# Patient Record
Sex: FEMALE | Race: WHITE | NOT HISPANIC OR LATINO | Employment: OTHER | URBAN - METROPOLITAN AREA
[De-identification: names, ages, dates, MRNs, and addresses within clinical notes are randomized per-mention and may not be internally consistent; named-entity substitution may affect disease eponyms.]

---

## 2021-03-06 ENCOUNTER — CLINICAL SUPPORT (OUTPATIENT)
Dept: URGENT CARE | Facility: CLINIC | Age: 66
End: 2021-03-06
Payer: COMMERCIAL

## 2021-03-06 VITALS — TEMPERATURE: 98 F

## 2021-03-06 DIAGNOSIS — Z11.52 ENCOUNTER FOR SCREENING FOR COVID-19: Primary | ICD-10-CM

## 2021-03-06 LAB
CTP QC/QA: YES
SARS-COV-2 RDRP RESP QL NAA+PROBE: NEGATIVE

## 2021-05-04 ENCOUNTER — PATIENT MESSAGE (OUTPATIENT)
Dept: RESEARCH | Facility: HOSPITAL | Age: 66
End: 2021-05-04

## 2021-05-10 ENCOUNTER — PATIENT MESSAGE (OUTPATIENT)
Dept: RESEARCH | Facility: HOSPITAL | Age: 66
End: 2021-05-10

## 2024-09-19 DIAGNOSIS — S42.292A OTHER CLOSED DISPLACED FRACTURE OF PROXIMAL END OF LEFT HUMERUS, INITIAL ENCOUNTER: ICD-10-CM

## 2024-09-19 DIAGNOSIS — S52.542A CLOSED SMITH'S FRACTURE OF LEFT RADIUS, INITIAL ENCOUNTER: Primary | ICD-10-CM

## 2024-09-19 DIAGNOSIS — S42.309A HUMERUS SHAFT FRACTURE: ICD-10-CM

## 2024-09-19 PROBLEM — S42.402A CLOSED FRACTURE OF DISTAL END OF LEFT HUMERUS: Status: RESOLVED | Noted: 2024-09-19 | Resolved: 2024-09-19

## 2024-09-19 PROBLEM — F32.A DEPRESSION: Status: ACTIVE | Noted: 2024-09-19

## 2024-09-19 PROBLEM — S42.402A CLOSED FRACTURE OF DISTAL END OF LEFT HUMERUS: Status: ACTIVE | Noted: 2024-09-19

## 2024-09-19 PROBLEM — Z71.89 ACP (ADVANCE CARE PLANNING): Status: ACTIVE | Noted: 2024-09-19

## 2024-09-19 PROBLEM — S32.592A CLOSED FRACTURE OF RAMUS OF LEFT PUBIS: Status: ACTIVE | Noted: 2024-09-19

## 2024-09-19 PROBLEM — S52.502A CLOSED FRACTURE OF DISTAL END OF LEFT RADIUS: Status: ACTIVE | Noted: 2024-09-19

## 2024-09-19 PROBLEM — S42.202A CLOSED FRACTURE OF PROXIMAL END OF LEFT HUMERUS: Status: ACTIVE | Noted: 2024-09-19

## 2024-09-19 PROBLEM — I10 PRIMARY HYPERTENSION: Status: ACTIVE | Noted: 2024-09-19

## 2024-09-19 RX ORDER — MUPIROCIN 20 MG/G
OINTMENT TOPICAL
Status: CANCELLED | OUTPATIENT
Start: 2024-09-19

## 2024-09-19 RX ORDER — CEFAZOLIN SODIUM 2 G/50ML
2 SOLUTION INTRAVENOUS
Status: CANCELLED | OUTPATIENT
Start: 2024-09-19

## 2024-09-20 PROBLEM — D64.9 POSTOPERATIVE ANEMIA: Status: ACTIVE | Noted: 2024-09-20

## 2024-10-02 DIAGNOSIS — S52.542A CLOSED SMITH'S FRACTURE OF LEFT RADIUS, INITIAL ENCOUNTER: Primary | ICD-10-CM

## 2024-10-03 ENCOUNTER — HOSPITAL ENCOUNTER (OUTPATIENT)
Dept: RADIOLOGY | Facility: HOSPITAL | Age: 69
Discharge: HOME | End: 2024-10-03
Attending: ORTHOPAEDIC SURGERY
Payer: MEDICARE

## 2024-10-03 ENCOUNTER — OFFICE VISIT (OUTPATIENT)
Dept: ORTHOPEDICS | Facility: CLINIC | Age: 69
End: 2024-10-03
Payer: MEDICARE

## 2024-10-03 VITALS — WEIGHT: 180 LBS | HEIGHT: 65 IN | BODY MASS INDEX: 29.99 KG/M2

## 2024-10-03 DIAGNOSIS — S52.542A CLOSED SMITH'S FRACTURE OF LEFT RADIUS, INITIAL ENCOUNTER: ICD-10-CM

## 2024-10-03 DIAGNOSIS — S42.292A OTHER CLOSED DISPLACED FRACTURE OF PROXIMAL END OF LEFT HUMERUS, INITIAL ENCOUNTER: Primary | ICD-10-CM

## 2024-10-03 DIAGNOSIS — S32.409A CLOSED NONDISPLACED FRACTURE OF ACETABULUM, UNSPECIFIED PORTION OF ACETABULUM, UNSPECIFIED LATERALITY, INITIAL ENCOUNTER: ICD-10-CM

## 2024-10-03 DIAGNOSIS — S42.292A OTHER CLOSED DISPLACED FRACTURE OF PROXIMAL END OF LEFT HUMERUS, INITIAL ENCOUNTER: ICD-10-CM

## 2024-10-03 PROCEDURE — 99024 POSTOP FOLLOW-UP VISIT: CPT | Mod: POP,,, | Performed by: ORTHOPAEDIC SURGERY

## 2024-10-03 PROCEDURE — 73060 X-RAY EXAM OF HUMERUS: CPT | Mod: TC,PO,LT

## 2024-10-03 PROCEDURE — 72190 X-RAY EXAM OF PELVIS: CPT | Mod: TC,PO

## 2024-10-03 PROCEDURE — 99213 OFFICE O/P EST LOW 20 MIN: CPT | Mod: PBBFAC,25,PO | Performed by: ORTHOPAEDIC SURGERY

## 2024-10-03 PROCEDURE — 99999 PR PBB SHADOW E&M-EST. PATIENT-LVL III: CPT | Mod: PBBFAC,,, | Performed by: ORTHOPAEDIC SURGERY

## 2024-10-03 PROCEDURE — 73090 X-RAY EXAM OF FOREARM: CPT | Mod: TC,PO,LT

## 2024-10-03 PROCEDURE — 72190 X-RAY EXAM OF PELVIS: CPT | Mod: 26,,, | Performed by: RADIOLOGY

## 2024-10-03 PROCEDURE — 73090 X-RAY EXAM OF FOREARM: CPT | Mod: 26,LT,, | Performed by: RADIOLOGY

## 2024-10-03 PROCEDURE — 73060 X-RAY EXAM OF HUMERUS: CPT | Mod: 26,LT,, | Performed by: RADIOLOGY

## 2024-10-17 NOTE — PROGRESS NOTES
Chief Complaint   Patient presents with    Left Upper Arm - Pain, Injury       HPI:  69 y.o. returns to clinic today status post  left humerus ORIF and nonop pelvic ring fracture treatment 2 weeks ago. Pain is mild. Patient is compliant most of the time with restrictions.     LUE    Incision healed. No erythema or fluctuance. Overall normal alignment. Mild point TTP about the fracture site. Decreased ROM due to stiffness. Compartments soft. Skin intact. NVI distally.    Pelvis  Mild point TTP about the pubis. FROM hips. NVI distally.     X-rays were performed today, personally reviewed by me and findings discussed with the patient.  2 views of the left humerus show hardware intact in good position    Other closed displaced fracture of proximal end of left humerus, initial encounter  -     X-Ray Humerus 2 View Left; Future; Expected date: 10/03/2024    Closed nondisplaced fracture of acetabulum, unspecified portion of acetabulum, unspecified laterality, initial encounter  -     X-Ray Humerus 2 View Left; Future; Expected date: 10/03/2024  -     X-Ray Pelvis Complete min 3 views; Future; Expected date: 10/03/2024        Staples out. Begin codmans. Strict nwb to arm. RTC 4 weeks recheck.

## 2024-10-30 DIAGNOSIS — S42.292A OTHER CLOSED DISPLACED FRACTURE OF PROXIMAL END OF LEFT HUMERUS, INITIAL ENCOUNTER: Primary | ICD-10-CM

## 2024-10-30 DIAGNOSIS — S32.409A CLOSED NONDISPLACED FRACTURE OF ACETABULUM, UNSPECIFIED PORTION OF ACETABULUM, UNSPECIFIED LATERALITY, INITIAL ENCOUNTER: ICD-10-CM

## 2024-10-31 ENCOUNTER — HOSPITAL ENCOUNTER (OUTPATIENT)
Dept: RADIOLOGY | Facility: HOSPITAL | Age: 69
Discharge: HOME | End: 2024-10-31
Attending: ORTHOPAEDIC SURGERY
Payer: MEDICARE

## 2024-10-31 ENCOUNTER — OFFICE VISIT (OUTPATIENT)
Dept: ORTHOPEDICS | Facility: CLINIC | Age: 69
End: 2024-10-31
Payer: MEDICARE

## 2024-10-31 VITALS — HEIGHT: 65 IN | WEIGHT: 179.88 LBS | BODY MASS INDEX: 29.97 KG/M2

## 2024-10-31 DIAGNOSIS — S32.409A CLOSED NONDISPLACED FRACTURE OF ACETABULUM, UNSPECIFIED PORTION OF ACETABULUM, UNSPECIFIED LATERALITY, INITIAL ENCOUNTER: ICD-10-CM

## 2024-10-31 DIAGNOSIS — S52.502A CLOSED FRACTURE OF DISTAL END OF LEFT RADIUS, UNSPECIFIED FRACTURE MORPHOLOGY, INITIAL ENCOUNTER: ICD-10-CM

## 2024-10-31 DIAGNOSIS — S42.292A OTHER CLOSED DISPLACED FRACTURE OF PROXIMAL END OF LEFT HUMERUS, INITIAL ENCOUNTER: ICD-10-CM

## 2024-10-31 DIAGNOSIS — S42.292A OTHER CLOSED DISPLACED FRACTURE OF PROXIMAL END OF LEFT HUMERUS, INITIAL ENCOUNTER: Primary | ICD-10-CM

## 2024-10-31 PROCEDURE — 72170 X-RAY EXAM OF PELVIS: CPT | Mod: TC,PO

## 2024-10-31 PROCEDURE — 99213 OFFICE O/P EST LOW 20 MIN: CPT | Mod: PBBFAC,25,PO | Performed by: ORTHOPAEDIC SURGERY

## 2024-10-31 PROCEDURE — 73110 X-RAY EXAM OF WRIST: CPT | Mod: 26,LT,, | Performed by: RADIOLOGY

## 2024-10-31 PROCEDURE — 73060 X-RAY EXAM OF HUMERUS: CPT | Mod: 26,LT,, | Performed by: RADIOLOGY

## 2024-10-31 PROCEDURE — 73060 X-RAY EXAM OF HUMERUS: CPT | Mod: TC,PO,LT

## 2024-10-31 PROCEDURE — 99999PBSHW PR PBB SHADOW TECHNICAL ONLY FILED TO HB: Mod: PBBFAC,,,

## 2024-10-31 PROCEDURE — 20610 DRAIN/INJ JOINT/BURSA W/O US: CPT | Mod: PBBFAC,PO | Performed by: ORTHOPAEDIC SURGERY

## 2024-10-31 PROCEDURE — 99024 POSTOP FOLLOW-UP VISIT: CPT | Mod: POP,,, | Performed by: ORTHOPAEDIC SURGERY

## 2024-10-31 PROCEDURE — 72170 X-RAY EXAM OF PELVIS: CPT | Mod: 26,,, | Performed by: RADIOLOGY

## 2024-10-31 PROCEDURE — 99999 PR PBB SHADOW E&M-EST. PATIENT-LVL III: CPT | Mod: PBBFAC,,, | Performed by: ORTHOPAEDIC SURGERY

## 2024-10-31 PROCEDURE — 73110 X-RAY EXAM OF WRIST: CPT | Mod: TC,PO,LT

## 2024-10-31 RX ADMIN — TRIAMCINOLONE ACETONIDE 40 MG: 40 INJECTION, SUSPENSION INTRA-ARTICULAR; INTRAMUSCULAR at 11:10

## 2024-11-04 ENCOUNTER — TELEPHONE (OUTPATIENT)
Dept: ORTHOPEDICS | Facility: CLINIC | Age: 69
End: 2024-11-04
Payer: MEDICARE

## 2024-11-04 DIAGNOSIS — S32.409A CLOSED NONDISPLACED FRACTURE OF ACETABULUM, UNSPECIFIED PORTION OF ACETABULUM, UNSPECIFIED LATERALITY, INITIAL ENCOUNTER: ICD-10-CM

## 2024-11-04 DIAGNOSIS — S42.292A OTHER CLOSED DISPLACED FRACTURE OF PROXIMAL END OF LEFT HUMERUS, INITIAL ENCOUNTER: Primary | ICD-10-CM

## 2024-11-04 NOTE — TELEPHONE ENCOUNTER
----- Message from Va Buenrostro sent at 11/1/2024  4:45 PM CDT -----    ----- Message -----  From: Fantasma Stokes  Sent: 11/1/2024   4:24 PM CDT  To: Bjorn Carrillo Staff    [facility] would like a callback.      Orders were sent for PT but patient insists that is was supposed to be OT      Phone [phone]    Fax [phone]    Patient is scheduled for OT on 11/04/24

## 2024-11-11 RX ORDER — TRIAMCINOLONE ACETONIDE 40 MG/ML
40 INJECTION, SUSPENSION INTRA-ARTICULAR; INTRAMUSCULAR
Status: DISCONTINUED | OUTPATIENT
Start: 2024-10-31 | End: 2024-11-11 | Stop reason: HOSPADM

## 2024-11-11 NOTE — PROCEDURES
Large Joint Aspiration/Injection: L subacromial bursa    Date/Time: 10/31/2024 11:00 AM    Performed by: Gerardo Milan MD  Authorized by: Gerardo Milan MD    Consent Done?:  Yes (Verbal)  Indications:  Pain  Timeout: prior to procedure the correct patient, procedure, and site was verified    Prep: patient was prepped and draped in usual sterile fashion      Local anesthesia used?: Yes    Local anesthetic:  Lidocaine 1% without epinephrine  Anesthetic total (ml):  5      Details:  Needle Size:  21 G  Ultrasonic Guidance for needle placement?: No    Approach:  Posterior  Location:  Shoulder  Site:  L subacromial bursa  Medications:  40 mg triamcinolone acetonide 40 mg/mL  Patient tolerance:  Patient tolerated the procedure well with no immediate complications

## 2024-11-11 NOTE — PROGRESS NOTES
Chief Complaint   Patient presents with    Pelvis - Post-op Evaluation    Left Wrist - Post-op Evaluation    Left Upper Arm - Pain       HPI:  69 y.o. returns to clinic today status post  left segmental humerus and left distal radius ORIF as well as nonop treatment pelvic ring fracture 6 weeks ago. Pain is mild. Patient is compliant most of the time with restrictions.       Bilateral Shoulder Exam    right Shoulder Exam   Shoulder exam performed same as contralateral side and is normal.    left Shoulder Exam   Tenderness   Shoulder tenderness location: diffusely about shoulder.    Range of Motion   Forward Flexion: abnormal   External Rotation: abnormal     Muscle Strength   Supraspinatus: 4/5     Tests   Hawkin's test: positive  Impingement: positive    Other   Erythema: absent  Sensation: normal  Pulse: present       X-rays were performed today, personally reviewed by me and findings discussed with the patient.  3 views of the left wrist and humerus show healed fracture in good position    Other closed displaced fracture of proximal end of left humerus, initial encounter  -     Ambulatory referral/consult to Physical/Occupational Therapy; Future; Expected date: 11/07/2024    Closed fracture of distal end of left radius, unspecified fracture morphology, initial encounter  -     Ambulatory referral/consult to Physical/Occupational Therapy; Future; Expected date: 11/07/2024  -     Ambulatory referral/consult to Physical/Occupational Therapy; Future; Expected date: 11/08/2024    Closed nondisplaced fracture of acetabulum, unspecified portion of acetabulum, unspecified laterality, initial encounter  -     Ambulatory referral/consult to Physical/Occupational Therapy; Future; Expected date: 11/07/2024          Using an aseptic technique, I injected 5 cc of lidocaine 1% without and 1 cc of kenalog 40mg into the left Shoulder. The patient tolerated this well. I will have them return to clinic in 6 weeks. Begin OT for  shoulder and wrist.

## 2024-12-09 DIAGNOSIS — S42.342A CLOSED DISPLACED SPIRAL FRACTURE OF SHAFT OF LEFT HUMERUS, INITIAL ENCOUNTER: ICD-10-CM

## 2024-12-09 DIAGNOSIS — S52.502A CLOSED FRACTURE OF DISTAL END OF LEFT RADIUS, UNSPECIFIED FRACTURE MORPHOLOGY, INITIAL ENCOUNTER: Primary | ICD-10-CM

## 2024-12-09 DIAGNOSIS — S32.409A CLOSED NONDISPLACED FRACTURE OF ACETABULUM, UNSPECIFIED PORTION OF ACETABULUM, UNSPECIFIED LATERALITY, INITIAL ENCOUNTER: ICD-10-CM

## 2024-12-12 ENCOUNTER — HOSPITAL ENCOUNTER (OUTPATIENT)
Dept: RADIOLOGY | Facility: HOSPITAL | Age: 69
Discharge: HOME | End: 2024-12-12
Attending: ORTHOPAEDIC SURGERY
Payer: MEDICARE

## 2024-12-12 ENCOUNTER — OFFICE VISIT (OUTPATIENT)
Dept: ORTHOPEDICS | Facility: CLINIC | Age: 69
End: 2024-12-12
Payer: MEDICARE

## 2024-12-12 VITALS — BODY MASS INDEX: 29.97 KG/M2 | HEIGHT: 65 IN | WEIGHT: 179.88 LBS

## 2024-12-12 DIAGNOSIS — S42.342A CLOSED DISPLACED SPIRAL FRACTURE OF SHAFT OF LEFT HUMERUS, INITIAL ENCOUNTER: ICD-10-CM

## 2024-12-12 DIAGNOSIS — S52.502A CLOSED FRACTURE OF DISTAL END OF LEFT RADIUS, UNSPECIFIED FRACTURE MORPHOLOGY, INITIAL ENCOUNTER: ICD-10-CM

## 2024-12-12 DIAGNOSIS — S32.409A CLOSED NONDISPLACED FRACTURE OF ACETABULUM, UNSPECIFIED PORTION OF ACETABULUM, UNSPECIFIED LATERALITY, INITIAL ENCOUNTER: Primary | ICD-10-CM

## 2024-12-12 DIAGNOSIS — S32.409A CLOSED NONDISPLACED FRACTURE OF ACETABULUM, UNSPECIFIED PORTION OF ACETABULUM, UNSPECIFIED LATERALITY, INITIAL ENCOUNTER: ICD-10-CM

## 2024-12-12 PROCEDURE — 72170 X-RAY EXAM OF PELVIS: CPT | Mod: 26,,, | Performed by: RADIOLOGY

## 2024-12-12 PROCEDURE — 99213 OFFICE O/P EST LOW 20 MIN: CPT | Mod: PBBFAC,25,PO | Performed by: ORTHOPAEDIC SURGERY

## 2024-12-12 PROCEDURE — 73110 X-RAY EXAM OF WRIST: CPT | Mod: TC,PO,LT

## 2024-12-12 PROCEDURE — 73060 X-RAY EXAM OF HUMERUS: CPT | Mod: TC,PO,LT

## 2024-12-12 PROCEDURE — 72170 X-RAY EXAM OF PELVIS: CPT | Mod: TC,PO

## 2024-12-12 PROCEDURE — 73060 X-RAY EXAM OF HUMERUS: CPT | Mod: 26,LT,, | Performed by: RADIOLOGY

## 2024-12-12 PROCEDURE — 73110 X-RAY EXAM OF WRIST: CPT | Mod: 26,LT,, | Performed by: RADIOLOGY

## 2024-12-12 PROCEDURE — 99999 PR PBB SHADOW E&M-EST. PATIENT-LVL III: CPT | Mod: PBBFAC,,, | Performed by: ORTHOPAEDIC SURGERY

## 2024-12-19 NOTE — PROGRESS NOTES
Chief Complaint   Patient presents with    Left Wrist - Pain    Left Upper Arm - Pain    Pelvis - Pain    Left Shoulder - Pain       HPI:   This is a 69 y.o. who returns today status post left humerus and distal radius ORIF 3 months ago. Patient has been FWB.  Pain is minimal. No numbness or tingling. No associated signs or symptoms.    History reviewed. No pertinent past medical history.  Past Surgical History:   Procedure Laterality Date    OPEN REDUCTION AND INTERNAL FIXATION (ORIF) OF FRACTURE OF DISTAL RADIUS Left 9/19/2024    Procedure: ORIF, FRACTURE, RADIUS, DISTAL;  Surgeon: Gerardo Milan MD;  Location: [facility];  Service: Orthopedics;  Laterality: Left;    ORIF HUMERUS FRACTURE Left 9/19/2024    Procedure: ORIF, FRACTURE HUMERUS - Open;  Surgeon: Gerardo Milan MD;  Location: [facility];  Service: Orthopedics;  Laterality: Left;     Current Outpatient Medications on File Prior to Visit   Medication Sig Dispense Refill    EScitalopram oxalate (LEXAPRO) 20 MG tablet Take 20 mg by mouth every evening.      estrogens,esterified,-methyltestosterone 0.625-1.25mg (ESTRATEST HS) per tablet Take 1 tablet by mouth every evening.      ferrous gluconate (FERGON) 324 MG tablet TAKE 1 TABLET (324 MG TOTAL) BY MOUTH 3 (THREE) TIMES DAILY WITH MEALS. 270 tablet 1    lisinopriL (PRINIVIL,ZESTRIL) 5 MG tablet Take 5 mg by mouth once daily. Indications: high blood pressure      propranoloL (INDERAL) 10 MG tablet Take 10 mg by mouth 2 (two) times daily.      aspirin 325 MG tablet Take 1 tablet (325 mg total) by mouth once daily. 30 tablet 0    pregabalin (LYRICA) 150 MG capsule Take 1 capsule (150 mg total) by mouth every evening. 30 capsule 0     No current facility-administered medications on file prior to visit.     Review of patient's allergies indicates:   Allergen Reactions    Penicillins Hives    Codeine Nausea And Vomiting     Family History   Problem Relation Name Age of Onset    Breast cancer Mother      Ovarian  cancer Mother       Social History     Socioeconomic History    Marital status:      Social Drivers of Health     Food Insecurity: No Food Insecurity (9/19/2024)    Hunger Vital Sign     Worried About Running Out of Food in the Last Year: Never true     Ran Out of Food in the Last Year: Never true   Transportation Needs: No Transportation Needs (10/4/2024)    OASIS : Transportation     Lack of Transportation (Medical): No     Lack of Transportation (Non-Medical): No     Patient Unable or Declines to Respond: No   Housing Stability: Low Risk  (9/19/2024)    Housing Stability Vital Sign     Unable to Pay for Housing in the Last Year: No     Homeless in the Last Year: No       Review of Systems:  Constitutional:  Denies fever or chills   Eyes:  Denies change in visual acuity   HENT:  Denies nasal congestion or sore throat   Respiratory:  Denies cough or shortness of breath   Cardiovascular:  Denies chest pain or edema   GI:  Denies abdominal pain, nausea, vomiting, bloody stools or diarrhea   :  Denies dysuria   Integument:  Denies rash   Neurologic:  Denies headache, focal weakness or sensory changes   Endocrine:  Denies polyuria or polydipsia   Lymphatic:  Denies swollen glands   Psychiatric:  Denies depression or anxiety     Physical Exam:   Constitutional:  Well developed, well nourished, no acute distress, non-toxic appearance   Integument:  Well hydrated, no rash   Lymphatic:  No lymphadenopathy noted   Neurologic:  Alert & oriented x 3, CN 2-12 normal, normal motor function, normal sensory function, no focal deficits noted   Psychiatric:  Speech and behavior appropriate   Gi: abdomen soft  Eyes: EOMI    L humerus/wrist  FROM with minimal pain. No point TTP noted. Skin intact. Compartments soft. NVI distally.     X-rays were performed today, personally reviewed by me and findings discussed with the patient.  3 views of the left wrist and humerus show healed fractures in good position      Closed  nondisplaced fracture of acetabulum, unspecified portion of acetabulum, unspecified laterality, initial encounter    Closed displaced spiral fracture of shaft of left humerus, initial encounter    Closed fracture of distal end of left radius, unspecified fracture morphology, initial encounter        Continue as tolerated. RTC as needed.